# Patient Record
Sex: FEMALE | Race: WHITE | NOT HISPANIC OR LATINO
[De-identification: names, ages, dates, MRNs, and addresses within clinical notes are randomized per-mention and may not be internally consistent; named-entity substitution may affect disease eponyms.]

---

## 2017-10-26 PROBLEM — Z00.00 ENCOUNTER FOR PREVENTIVE HEALTH EXAMINATION: Status: ACTIVE | Noted: 2017-10-26

## 2017-10-31 ENCOUNTER — APPOINTMENT (OUTPATIENT)
Dept: ORTHOPEDIC SURGERY | Facility: CLINIC | Age: 57
End: 2017-10-31

## 2019-12-10 ENCOUNTER — EMERGENCY (EMERGENCY)
Facility: HOSPITAL | Age: 59
LOS: 1 days | Discharge: HOME | End: 2019-12-10
Attending: EMERGENCY MEDICINE | Admitting: EMERGENCY MEDICINE
Payer: MEDICARE

## 2019-12-10 VITALS
TEMPERATURE: 99 F | HEART RATE: 78 BPM | RESPIRATION RATE: 18 BRPM | SYSTOLIC BLOOD PRESSURE: 111 MMHG | DIASTOLIC BLOOD PRESSURE: 71 MMHG | WEIGHT: 160.06 LBS | OXYGEN SATURATION: 97 %

## 2019-12-10 PROCEDURE — 70450 CT HEAD/BRAIN W/O DYE: CPT | Mod: 26

## 2019-12-10 PROCEDURE — 99285 EMERGENCY DEPT VISIT HI MDM: CPT

## 2019-12-10 PROCEDURE — 93010 ELECTROCARDIOGRAM REPORT: CPT

## 2019-12-10 NOTE — ED ADULT TRIAGE NOTE - CHIEF COMPLAINT QUOTE
pt. with hx of Lt side hemiplegia, hx of CVA presents with c/o Rt side neck pain, headache that started around 1pm today and dizziness that started about 2 hours ago.

## 2019-12-11 VITALS
DIASTOLIC BLOOD PRESSURE: 57 MMHG | OXYGEN SATURATION: 95 % | RESPIRATION RATE: 16 BRPM | HEART RATE: 89 BPM | SYSTOLIC BLOOD PRESSURE: 97 MMHG | TEMPERATURE: 98 F

## 2019-12-11 LAB
ALBUMIN SERPL ELPH-MCNC: 3.9 G/DL — SIGNIFICANT CHANGE UP (ref 3.3–5)
ALP SERPL-CCNC: 159 U/L — HIGH (ref 40–120)
ALT FLD-CCNC: 13 U/L — SIGNIFICANT CHANGE UP (ref 10–45)
ANION GAP SERPL CALC-SCNC: 13 MMOL/L — SIGNIFICANT CHANGE UP (ref 5–17)
APTT BLD: 32.8 SEC — SIGNIFICANT CHANGE UP (ref 27.5–36.3)
AST SERPL-CCNC: 13 U/L — SIGNIFICANT CHANGE UP (ref 10–40)
BASOPHILS # BLD AUTO: 0.03 K/UL — SIGNIFICANT CHANGE UP (ref 0–0.2)
BASOPHILS NFR BLD AUTO: 0.3 % — SIGNIFICANT CHANGE UP (ref 0–2)
BILIRUB SERPL-MCNC: 0.4 MG/DL — SIGNIFICANT CHANGE UP (ref 0.2–1.2)
BLD GP AB SCN SERPL QL: NEGATIVE — SIGNIFICANT CHANGE UP
BUN SERPL-MCNC: 12 MG/DL — SIGNIFICANT CHANGE UP (ref 7–23)
CALCIUM SERPL-MCNC: 9.6 MG/DL — SIGNIFICANT CHANGE UP (ref 8.4–10.5)
CHLORIDE SERPL-SCNC: 103 MMOL/L — SIGNIFICANT CHANGE UP (ref 96–108)
CHOLEST SERPL-MCNC: 204 MG/DL — HIGH (ref 10–199)
CO2 SERPL-SCNC: 25 MMOL/L — SIGNIFICANT CHANGE UP (ref 22–31)
CREAT SERPL-MCNC: 0.58 MG/DL — SIGNIFICANT CHANGE UP (ref 0.5–1.3)
EOSINOPHIL # BLD AUTO: 0.09 K/UL — SIGNIFICANT CHANGE UP (ref 0–0.5)
EOSINOPHIL NFR BLD AUTO: 0.8 % — SIGNIFICANT CHANGE UP (ref 0–6)
GLUCOSE SERPL-MCNC: 128 MG/DL — HIGH (ref 70–99)
HBA1C BLD-MCNC: 6.1 % — HIGH (ref 4–5.6)
HCT VFR BLD CALC: 39.1 % — SIGNIFICANT CHANGE UP (ref 34.5–45)
HDLC SERPL-MCNC: 40 MG/DL — LOW
HGB BLD-MCNC: 11.7 G/DL — SIGNIFICANT CHANGE UP (ref 11.5–15.5)
IMM GRANULOCYTES NFR BLD AUTO: 0.4 % — SIGNIFICANT CHANGE UP (ref 0–1.5)
INR BLD: 1.01 — SIGNIFICANT CHANGE UP (ref 0.88–1.16)
LIPID PNL WITH DIRECT LDL SERPL: 130 MG/DL — HIGH
LYMPHOCYTES # BLD AUTO: 1.67 K/UL — SIGNIFICANT CHANGE UP (ref 1–3.3)
LYMPHOCYTES # BLD AUTO: 14.7 % — SIGNIFICANT CHANGE UP (ref 13–44)
MCHC RBC-ENTMCNC: 24.9 PG — LOW (ref 27–34)
MCHC RBC-ENTMCNC: 29.9 GM/DL — LOW (ref 32–36)
MCV RBC AUTO: 83.2 FL — SIGNIFICANT CHANGE UP (ref 80–100)
MONOCYTES # BLD AUTO: 0.73 K/UL — SIGNIFICANT CHANGE UP (ref 0–0.9)
MONOCYTES NFR BLD AUTO: 6.4 % — SIGNIFICANT CHANGE UP (ref 2–14)
NEUTROPHILS # BLD AUTO: 8.82 K/UL — HIGH (ref 1.8–7.4)
NEUTROPHILS NFR BLD AUTO: 77.4 % — HIGH (ref 43–77)
NRBC # BLD: 0 /100 WBCS — SIGNIFICANT CHANGE UP (ref 0–0)
PLATELET # BLD AUTO: 194 K/UL — SIGNIFICANT CHANGE UP (ref 150–400)
POTASSIUM SERPL-MCNC: 3.7 MMOL/L — SIGNIFICANT CHANGE UP (ref 3.5–5.3)
POTASSIUM SERPL-SCNC: 3.7 MMOL/L — SIGNIFICANT CHANGE UP (ref 3.5–5.3)
PROT SERPL-MCNC: 6.7 G/DL — SIGNIFICANT CHANGE UP (ref 6–8.3)
PROTHROM AB SERPL-ACNC: 11.4 SEC — SIGNIFICANT CHANGE UP (ref 10–12.9)
RBC # BLD: 4.7 M/UL — SIGNIFICANT CHANGE UP (ref 3.8–5.2)
RBC # FLD: 14.3 % — SIGNIFICANT CHANGE UP (ref 10.3–14.5)
RH IG SCN BLD-IMP: POSITIVE — SIGNIFICANT CHANGE UP
SODIUM SERPL-SCNC: 141 MMOL/L — SIGNIFICANT CHANGE UP (ref 135–145)
TOTAL CHOLESTEROL/HDL RATIO MEASUREMENT: 5.1 RATIO — SIGNIFICANT CHANGE UP (ref 3.3–7.1)
TRIGL SERPL-MCNC: 170 MG/DL — HIGH (ref 10–149)
TROPONIN T SERPL-MCNC: <0.01 NG/ML — SIGNIFICANT CHANGE UP (ref 0–0.01)
WBC # BLD: 11.39 K/UL — HIGH (ref 3.8–10.5)
WBC # FLD AUTO: 11.39 K/UL — HIGH (ref 3.8–10.5)

## 2019-12-11 PROCEDURE — 93010 ELECTROCARDIOGRAM REPORT: CPT

## 2019-12-11 PROCEDURE — 80061 LIPID PANEL: CPT

## 2019-12-11 PROCEDURE — 99285 EMERGENCY DEPT VISIT HI MDM: CPT | Mod: 25

## 2019-12-11 PROCEDURE — 36415 COLL VENOUS BLD VENIPUNCTURE: CPT

## 2019-12-11 PROCEDURE — 84484 ASSAY OF TROPONIN QUANT: CPT

## 2019-12-11 PROCEDURE — 80053 COMPREHEN METABOLIC PANEL: CPT

## 2019-12-11 PROCEDURE — 85025 COMPLETE CBC W/AUTO DIFF WBC: CPT

## 2019-12-11 PROCEDURE — 83036 HEMOGLOBIN GLYCOSYLATED A1C: CPT

## 2019-12-11 PROCEDURE — 70450 CT HEAD/BRAIN W/O DYE: CPT

## 2019-12-11 PROCEDURE — 93005 ELECTROCARDIOGRAM TRACING: CPT

## 2019-12-11 PROCEDURE — 0042T: CPT

## 2019-12-11 PROCEDURE — 70498 CT ANGIOGRAPHY NECK: CPT

## 2019-12-11 PROCEDURE — 86850 RBC ANTIBODY SCREEN: CPT

## 2019-12-11 PROCEDURE — 71045 X-RAY EXAM CHEST 1 VIEW: CPT | Mod: 26

## 2019-12-11 PROCEDURE — 86900 BLOOD TYPING SEROLOGIC ABO: CPT

## 2019-12-11 PROCEDURE — 85730 THROMBOPLASTIN TIME PARTIAL: CPT

## 2019-12-11 PROCEDURE — 70496 CT ANGIOGRAPHY HEAD: CPT

## 2019-12-11 PROCEDURE — 85610 PROTHROMBIN TIME: CPT

## 2019-12-11 PROCEDURE — 70496 CT ANGIOGRAPHY HEAD: CPT | Mod: 26

## 2019-12-11 PROCEDURE — 86901 BLOOD TYPING SEROLOGIC RH(D): CPT

## 2019-12-11 PROCEDURE — 71045 X-RAY EXAM CHEST 1 VIEW: CPT

## 2019-12-11 PROCEDURE — 82962 GLUCOSE BLOOD TEST: CPT

## 2019-12-11 PROCEDURE — 70498 CT ANGIOGRAPHY NECK: CPT | Mod: 26

## 2019-12-11 RX ORDER — DIAZEPAM 5 MG
10 TABLET ORAL ONCE
Refills: 0 | Status: DISCONTINUED | OUTPATIENT
Start: 2019-12-11 | End: 2019-12-11

## 2019-12-11 RX ORDER — OXYCODONE AND ACETAMINOPHEN 5; 325 MG/1; MG/1
1 TABLET ORAL ONCE
Refills: 0 | Status: DISCONTINUED | OUTPATIENT
Start: 2019-12-11 | End: 2019-12-11

## 2019-12-11 RX ADMIN — Medication 10 MILLIGRAM(S): at 00:51

## 2019-12-11 RX ADMIN — OXYCODONE AND ACETAMINOPHEN 1 TABLET(S): 5; 325 TABLET ORAL at 03:27

## 2019-12-11 NOTE — ED ADULT NURSE NOTE - NSIMPLEMENTINTERV_GEN_ALL_ED
Implemented All Fall with Harm Risk Interventions:  [place] to call system. Call bell, personal items and telephone within reach. Instruct patient to call for assistance. Room bathroom lighting operational. Non-slip footwear when patient is off stretcher. Physically safe environment: no spills, clutter or unnecessary equipment. Stretcher in lowest position, wheels locked, appropriate side rails in place. Provide visual cue, wrist band, yellow gown, etc. Monitor gait and stability. Monitor for mental status changes and reorient to person, place, and time. Review medications for side effects contributing to fall risk. Reinforce activity limits and safety measures with patient and family. Provide visual clues: red socks.

## 2019-12-11 NOTE — ED PROVIDER NOTE - PATIENT PORTAL LINK FT
You can access the FollowMyHealth Patient Portal offered by [facility] by registering at the following website: [email or web address]. By joining [facility]’s FollowMyHealth portal, you will also be able to view your health information using other applications (apps) compatible with our system.

## 2019-12-11 NOTE — ED PROVIDER NOTE - OBJECTIVE STATEMENT
58 y/o F with PMHx of subdural hematoma as infant and CVA 6 years ago with residual left sided hemiplegia presents to ED wheelchair bound from nursing home complaining of right sided neck pain and stiffness for 10 hrs, followed by dizziness for past 2 hrs. Pt denies trauma, fall chest pain, SOB, change in vision or speech. Pt states she is on a lot of prescription medication but did not bring in any paperwork from nursing home, therefore unclear what complete PMHx is. 58 y/o F with PMHx of chronic back pain,  subdural hematoma as infant and CVA 6 years ago with residual left sided hemiplegia presents to ED wheelchair bound from nursing home complaining of right sided neck pain and stiffness for 10 hrs, followed by dizziness for past 2 hrs. Pt denies trauma, fall chest pain, SOB, change in vision or speech. Pt states she is on a lot of prescription medication but did not bring in any paperwork from nursing home, therefore unclear what complete PMHx is.

## 2019-12-11 NOTE — ED PROVIDER NOTE - CLINICAL SUMMARY MEDICAL DECISION MAKING FREE TEXT BOX
58 y/o F with PMHx of subdural hematoma as infant, and CVA 6 years ago presents to ED complaining of     right sided neck pain x 10 hrs and dizziness x 2 hrs. Given     acuity of symptoms and CVA hx stroke code called. Pt sent immediately to CT scan. Disposition pending workup. 58 y/o F with PMHx of subdural hematoma as infant, and CVA 6 years ago presents to ED complaining of right sided neck pain x 10 hrs and dizziness x 2 hrs. Given  acuity of symptoms and hx of CVA hx stroke code called. Pt sent immediately to CT scan. Disposition pending workup.

## 2019-12-11 NOTE — ED PROVIDER NOTE - NSFOLLOWUPINSTRUCTIONS_ED_ALL_ED_FT
Please follow up with your primary care physician. If you have any problem getting an appointment please call the ED Referral Coordinator at [phone].  Return to the Emergency Department if you have any new or worsening symptoms, or for any other concerns. Please read below for further information.    You were seen in the ER for neck pain and dizziness. Your workup did not reveal any evidence of acute stroke. Your labs work is attached.   Please follow up with your doctor for further mgmt of you musculoskeletal pain.    Musculoskeletal Pain  Musculoskeletal pain refers to aches and pains in your bones, joints, muscles, and the tissues that surround them. This pain can occur in any part of the body. It can last for a short time (acute) or a long time (chronic).    A physical exam, lab tests, and imaging studies may be done to find the cause of your musculoskeletal pain.    Follow these instructions at home:  Lifestyle     Try to control or lower your stress levels. Stress increases muscle tension and can worsen musculoskeletal pain. It is important to recognize when you are anxious or stressed and learn ways to manage it. This may include:    Meditation or yoga.  Cognitive or behavioral therapy.  Acupuncture or massage therapy.    You may continue all activities unless the activities cause more pain. When the pain gets better, slowly resume your normal activities. Gradually increase the intensity and duration of your activities or exercise.  Managing pain, stiffness, and swelling     Take over-the-counter and prescription medicines only as told by your health care provider.  When your pain is severe, bed rest may be helpful. Lie or sit in any position that is comfortable, but get out of bed and walk around at least every couple of hours.  If directed, apply heat to the affected area as often as told by your health care provider. Use the heat source that your health care provider recommends, such as a moist heat pack or a heating pad.    Place a towel between your skin and the heat source.  Leave the heat on for 20–30 minutes.  Remove the heat if your skin turns bright red. This is especially important if you are unable to feel pain, heat, or cold. You may have a greater risk of getting burned.    If directed, put ice on the painful area.    Put ice in a plastic bag.  Place a towel between your skin and the bag.  Leave the ice on for 20 minutes, 2–3 times a day.    General instructions:  Your health care provider may recommend that you see a physical therapist. This person can help you come up with a safe exercise program. Do any exercises as told by your physical therapist.  Keep all follow-up visits, including any physical therapy visits, as told by your health care providers. This is important.  Contact a health care provider if:  Your pain gets worse.  Medicines do not help ease your pain.  You cannot use the part of your body that hurts, such as your arm, leg, or neck.  You have trouble sleeping.  You have trouble doing your normal activities.  Get help right away if:  You have a new injury and your pain is worse or different.  You feel numb or you have tingling in the painful area.  Summary  Musculoskeletal pain refers to aches and pains in your bones, joints, muscles, and the tissues that surround them.  This pain can occur in any part of the body.  Your health care provider may recommend that you see a physical therapist. This person can help you come up with a safe exercise program. Do any exercises as told by your physical therapist.  Lower your stress level. Stress can worsen musculoskeletal pain. Ways to lower stress may include meditation, yoga, cognitive or behavioral therapy, acupuncture, and massage therapy.  This information is not intended to replace advice given to you by your health care provider. Make sure you discuss any questions you have with your health care provider.

## 2019-12-11 NOTE — CONSULT NOTE ADULT - SUBJECTIVE AND OBJECTIVE BOX
**STROKE CODE CONSULT NOTE**    Last known well time/Time of onset of symptoms:    HPI: 59y Female with PMHx of     T(C): 37 (12-10-19 @ 23:16), Max: 37 (12-10-19 @ 23:16)  HR: 75 (12-11-19 @ 01:09) (75 - 78)  BP: 111/53 (12-11-19 @ 01:09) (111/53 - 139/74)  RR: 16 (12-11-19 @ 01:09) (16 - 18)  SpO2: 97% (12-11-19 @ 01:09) (97% - 99%)    PAST MEDICAL & SURGICAL HISTORY:      FAMILY HISTORY:      SOCIAL HISTORY:  Denies smoking, drinking, or drug use    ROS: ***  Constitutional: No fever, weight loss or fatigue  Eyes: No eye pain, visual disturbances, or discharge  ENMT:  No difficulty hearing, tinnitus, vertigo; No sinus or throat pain  Neck: No pain or stiffness  Respiratory: No cough, wheezing, chills or hemoptysis  Cardiovascular: No chest pain, palpitations, shortness of breath, dizziness or leg swelling  Gastrointestinal: No abdominal pain. No nausea, vomiting or hematemesis; No diarrhea or constipation. Nohematochezia.  Genitourinary: No dysuria, frequency, hematuria or incontinence  Neurological: As per HPI  Skin: No itching, burning, rashes or lesions   Endocrine: No heat or cold intolerance; No hair loss  Musculoskeletal: No joint pain or swelling; No muscle, back or extremity pain  Psychiatric: No depression, anxiety, mood swings or difficulty sleeping  Heme/Lymph: No easy bruising or bleeding gums    MEDICATIONS  (STANDING):    MEDICATIONS  (PRN):    Allergies    No Known Allergies    Intolerances      Vital Signs Last 24 Hrs  T(C): 37 (10 Dec 2019 23:16), Max: 37 (10 Dec 2019 23:16)  T(F): 98.6 (10 Dec 2019 23:16), Max: 98.6 (10 Dec 2019 23:16)  HR: 75 (11 Dec 2019 01:09) (75 - 78)  BP: 111/53 (11 Dec 2019 01:09) (111/53 - 139/74)  BP(mean): --  RR: 16 (11 Dec 2019 01:09) (16 - 18)  SpO2: 97% (11 Dec 2019 01:09) (97% - 99%)    Physical exam:  General: No acute distress, awake and alert  Cardiovascular: Regular rate and rhythm, no murmurs, rubs, or gallops. No bruits  Pulmonary: Anterior breath sounds clear bilaterally, no crackles or wheezing. No use of accessory muscles  Extremities: Radial and DP pulses +2, no edema    Neurologic:  -Mental status: Awake, alert, oriented to person, place, and time. Speech is fluent with intact naming, repetition, and comprehension, no dysarthria. Recent and remote memory intact. Follows commands. Attention/concentration intact. Fund of knowledge appropriate.  -Cranial nerves:   II: Visual fields are full to confrontation.  III, IV, VI: Extraocular movements are intact without nystagmus. Pupils equally round and reactive to light  V:  Facial sensation V1-V3 equal and intact   VII: Face is symmetric with normal eye closure and smile  VIII: Hearing is bilaterally intact to finger rub  IX, X: Uvula is midline and soft palate rises symmetrically  XI: Head turning and shoulder shrug are intact.  XII: Tongue protrudes midline  Motor: Normal bulk and tone. No pronator drift. Strength bilateral upper extremity 5/5, bilateral lower extremities 5/5.  Rapid alternating movements intact and symmetric  Sensation: Intact to light touch bilaterally. No neglect or extinction on double simultaneous testing.  Coordination: No dysmetria on finger-to-nose and heel-to-shin bilaterally  Reflexes: Downgoing toes bilaterally   Gait: Narrow gait and steady    NIHSS: ****    Fingerstick Blood Glucose: CAPILLARY BLOOD GLUCOSE      POCT Blood Glucose.: 110 mg/dL (10 Dec 2019 23:32)    LABS:                        11.7   11.39 )-----------( 194      ( 11 Dec 2019 00:00 )             39.1     12-11    141  |  103  |  12  ----------------------------<  128<H>  3.7   |  25  |  0.58    Ca    9.6      11 Dec 2019 00:00    TPro  6.7  /  Alb  3.9  /  TBili  0.4  /  DBili  x   /  AST  13  /  ALT  13  /  AlkPhos  159<H>  12-11    PT/INR - ( 11 Dec 2019 00:00 )   PT: 11.4 sec;   INR: 1.01          PTT - ( 11 Dec 2019 00:00 )  PTT:32.8 sec  CARDIAC MARKERS ( 11 Dec 2019 00:00 )  x     / <0.01 ng/mL / x     / x     / x              RADIOLOGY & ADDITIONAL STUDIES:    HCT:    CTA:    CTP:      -----------------------------------------------------------------------------------------------------------------  IV-tPA (Y/N):    ***                              Bolus time:  Reason IV-tPA not given: ***    ASSESSMENT/PLAN:    59y Female w/ PMH ***. HCT showed ***. CTA showed ***. Given *** tPA was ***. **STROKE CODE CONSULT NOTE**    Last known well time/Time of onset of symptoms: 12/10 1PM     HPI: 59y Female poor historianwith PMHx of subdural hematoma (age 4 years old with residual L sided weakness), CVA (6 years ago with residual gait instability), chronic back pain presents [facility] ED with R sided neck pain and dizziness. Pt reports that symptoms began at 1PM today. She denies this ever happening to her before. She states that symptoms came on suddenly. Reports that she feels like the room in spinning. Denies HA, weakness, changes in vision, nausea, abdominal pain. Denies being on any blood thinning medications. Pt is unsure of her other past medical history and she is unsure of which medications she takes but states she takes many. Reports that she is wheelchair bound since her stroke 6 years ago and states that she cannot care for herself.    T(C): 37 (12-10-19 @ 23:16), Max: 37 (12-10-19 @ 23:16)  HR: 75 (12-11-19 @ 01:09) (75 - 78)  BP: 111/53 (12-11-19 @ 01:09) (111/53 - 139/74)  RR: 16 (12-11-19 @ 01:09) (16 - 18)  SpO2: 97% (12-11-19 @ 01:09) (97% - 99%)    PAST MEDICAL & SURGICAL HISTORY:    SOCIAL HISTORY:  Denies smoking, drinking, or drug use    ROS:  Constitutional: No fever, weight loss or fatigue  Eyes: No eye pain, visual disturbances, or discharge  ENMT:  No difficulty hearing, tinnitus, vertigo; No sinus or throat pain  Neck: No pain or stiffness  Respiratory: No cough, wheezing, chills or hemoptysis  Cardiovascular: No chest pain, palpitations, shortness of breath, dizziness or leg swelling  Gastrointestinal: No abdominal pain. No nausea, vomiting or hematemesis; No diarrhea or constipation. Nohematochezia.  Genitourinary: No dysuria, frequency, hematuria or incontinence  Neurological: As per HPI  Skin: No itching, burning, rashes or lesions   Endocrine: No heat or cold intolerance; No hair loss  Musculoskeletal: No joint pain or swelling; No muscle, back or extremity pain  Psychiatric: No depression, anxiety, mood swings or difficulty sleeping  Heme/Lymph: No easy bruising or bleeding gums    MEDICATIONS  (STANDING):  Reports that she takes daily Gabapentin but is unsure of her dose    MEDICATIONS  (PRN):    Allergies    No Known Allergies    Intolerances      Vital Signs Last 24 Hrs  T(C): 37 (10 Dec 2019 23:16), Max: 37 (10 Dec 2019 23:16)  T(F): 98.6 (10 Dec 2019 23:16), Max: 98.6 (10 Dec 2019 23:16)  HR: 75 (11 Dec 2019 01:09) (75 - 78)  BP: 111/53 (11 Dec 2019 01:09) (111/53 - 139/74)  BP(mean): --  RR: 16 (11 Dec 2019 01:09) (16 - 18)  SpO2: 97% (11 Dec 2019 01:09) (97% - 99%)    Physical exam:  General: Anxious appearing, awake and alert  Cardiovascular: Regular rate and rhythm, no murmurs, rubs, or gallops. No bruits  Pulmonary: Anterior breath sounds clear bilaterally, no crackles or wheezing. No use of accessory muscles  Extremities: Radial and DP pulses +2, no edema    Neurologic:  -Mental status: Awake, alert, oriented to person, place, and time. Speech is fluent, no dysarthria. Recent and remote memory intact. Follows commands. Attention/concentration intact. Fund of knowledge appropriate.  -Cranial nerves:   II: Visual fields are full to confrontation.  III, IV, VI: Extraocular movements are intact without nystagmus. Pupils equally round and reactive to light, R eye is squeezed shut but patient can open it on command  V:  Facial sensation V1-V3 equal and intact   VII: Face is symmetric with normal eye closure and smile, pt keeps R eye closed but can open it on command  VIII: Hearing is bilaterally intact to finger rub  IX, X: Uvula is midline and soft palate rises symmetrically  XI: Head turning and shoulder shrug are intact.  XII: Tongue protrudes midline  Motor: Normal bulk and tone on R sided, reduced bulk and tone in LUE and LLE. No pronator drift on R. Strength in R upper extremity and RLE 5/5, LUE 4/5 strength, LLE 3/5 strength.  Sensation: Intact to light touch bilaterally. No neglect or extinction on double simultaneous testing.  Coordination: No dysmetria on finger-to-nose with R hand    NIHSS: 5    Fingerstick Blood Glucose: CAPILLARY BLOOD GLUCOSE      POCT Blood Glucose.: 110 mg/dL (10 Dec 2019 23:32)    LABS:                        11.7   11.39 )-----------( 194      ( 11 Dec 2019 00:00 )             39.1     12-11    141  |  103  |  12  ----------------------------<  128<H>  3.7   |  25  |  0.58    Ca    9.6      11 Dec 2019 00:00    TPro  6.7  /  Alb  3.9  /  TBili  0.4  /  DBili  x   /  AST  13  /  ALT  13  /  AlkPhos  159<H>  12-11    PT/INR - ( 11 Dec 2019 00:00 )   PT: 11.4 sec;   INR: 1.01          PTT - ( 11 Dec 2019 00:00 )  PTT:32.8 sec  CARDIAC MARKERS ( 11 Dec 2019 00:00 )  x     / <0.01 ng/mL / x     / x     / x              RADIOLOGY & ADDITIONAL STUDIES:    HCT: < from: CT Brain Stroke Protocol (12.10.19 @ 23:57) >  No acute intracranial hemorrhage or territorial infarction.    CTA: < from: CT Angio Head w/ IV Cont (12.11.19 @ 00:06) >  No steno-occlusive disease in the carotid or vertebral   arteries in theneck.    < end of copied text >  < from: CT Angio Head w/ IV Cont (12.11.19 @ 00:06) >  Impression: No large vessel occlusion or high-grade stenosis.    CTP:  < from: CT Perfusion w/ Maps w/ IV Cont (12.11.19 @ 00:07) >  No significant perfusion abnormality.    -----------------------------------------------------------------------------------------------------------------  IV-tPA (Y/N):   No                            Bolus time:  Reason IV-tPA not given: Stroke not seen on CT and not suspected on exam findings    ASSESSMENT/PLAN:    59y Female w/ PMHx of subdural hematoma (age 4 years old with residual L sided weakness), CVA (6 years ago with residual gait instability), chronic back pain. HCT showed no acute intracranial hemorrhage or infarct. CTA showed no occlusive disease in the carotid or vertebral arteries and no large vessle occlusion. Given negative CTH and low suspicion of stroke based on exam findings, tPA was no given.  - consider fluids and meclizine for dizziness  - pt does not appear to have had an acute stroke based on CT imaging and exam findings  - Dispo per ED team, pt does not need admission for stroke

## 2019-12-11 NOTE — ED PROVIDER NOTE - PROGRESS NOTE DETAILS
Pt feels much better, dizziness resolved. workup not c/w stroke, neuro team signed off. pt requests to be transferred back to her facility. She was advised to f/u with her doctors. Return precautions discussed.

## 2019-12-11 NOTE — ED ADULT NURSE NOTE - OBJECTIVE STATEMENT
pt. biba from NH with c/o Rt side neck pain, headache that started around 1pm and dizziness with blurry vision that started at around 9pm. pt. reports hx of brain bleed when she was a child and CVA 6 years ago that made her paralyzed and bedbound. stroke code was activated, ct done, blood was sent to lab, cardiac monitor in progress.

## 2019-12-11 NOTE — ED PROVIDER NOTE - PHYSICAL EXAMINATION
GEN: Well appearing, well nourished, awake, alert, oriented to person, place, time/situation and in no apparent distress.  ENT: Airway patent, Nasal mucosa clear. Mouth with normal mucosa.  EYES: Clear bilaterally.  RESPIRATORY: Breathing comfortably with normal RR.  CARDIAC: Regular rate and rhythm  ABDOMEN: Soft, nontender, +bowel sounds, no rebound, rigidity, or guarding.  MSK: Range of motion is not limited, no deformities noted.  NEURO: Alert and oriented, no focal deficits.  SKIN: Skin normal color for race, warm, dry and intact. No evidence of rash.  PSYCH: Alert and oriented to person, place, time/situation. normal mood and affect. no apparent risk to self or others. GEN: Well develop, well nourished, awake, alert, oriented to person, place, time/situation and in no apparent distress.  ENT: Airway patent, Nasal mucosa clear. Mouth with normal mucosa.  EYES: Clear bilaterally.  RESPIRATORY: Breathing comfortably with normal RR.  CARDIAC: Regular rate and rhythm  ABDOMEN: Soft, nontender, +bowel sounds, no rebound, rigidity, or guarding.  MSK: Left hemiplegia, +right paracervical muscel TTP, otherwise Range of motion is not limited, no deformities noted.  NEURO: Alert and oriented x 3. Left sided hemiplegia. Pt is bedbound.  SKIN: Skin normal color for race, warm, dry and intact. No evidence of rash.  PSYCH: Alert and oriented to person, place, time/situation. normal mood and affect. no apparent risk to self or others.

## 2019-12-17 DIAGNOSIS — M54.2 CERVICALGIA: ICD-10-CM

## 2019-12-17 DIAGNOSIS — R29.705 NIHSS SCORE 5: ICD-10-CM

## 2019-12-17 DIAGNOSIS — R42 DIZZINESS AND GIDDINESS: ICD-10-CM

## 2019-12-17 DIAGNOSIS — G81.92 HEMIPLEGIA, UNSPECIFIED AFFECTING LEFT DOMINANT SIDE: ICD-10-CM

## 2020-10-18 ENCOUNTER — EMERGENCY (EMERGENCY)
Facility: HOSPITAL | Age: 60
LOS: 1 days | Discharge: HOME | End: 2020-10-18
Attending: EMERGENCY MEDICINE | Admitting: EMERGENCY MEDICINE
Payer: MEDICARE

## 2020-10-18 VITALS
RESPIRATION RATE: 18 BRPM | TEMPERATURE: 99 F | SYSTOLIC BLOOD PRESSURE: 111 MMHG | WEIGHT: 160.06 LBS | OXYGEN SATURATION: 95 % | DIASTOLIC BLOOD PRESSURE: 72 MMHG | HEART RATE: 81 BPM | HEIGHT: 62 IN

## 2020-10-18 VITALS
OXYGEN SATURATION: 94 % | DIASTOLIC BLOOD PRESSURE: 79 MMHG | TEMPERATURE: 99 F | SYSTOLIC BLOOD PRESSURE: 117 MMHG | RESPIRATION RATE: 18 BRPM | HEART RATE: 88 BPM

## 2020-10-18 DIAGNOSIS — M25.561 PAIN IN RIGHT KNEE: ICD-10-CM

## 2020-10-18 DIAGNOSIS — M54.5 LOW BACK PAIN: ICD-10-CM

## 2020-10-18 PROBLEM — S06.5X9A TRAUMATIC SUBDURAL HEMORRHAGE WITH LOSS OF CONSCIOUSNESS OF UNSPECIFIED DURATION, INITIAL ENCOUNTER: Chronic | Status: ACTIVE | Noted: 2019-12-12

## 2020-10-18 PROBLEM — I63.9 CEREBRAL INFARCTION, UNSPECIFIED: Chronic | Status: ACTIVE | Noted: 2019-12-12

## 2020-10-18 PROCEDURE — 73562 X-RAY EXAM OF KNEE 3: CPT

## 2020-10-18 PROCEDURE — 96372 THER/PROPH/DIAG INJ SC/IM: CPT

## 2020-10-18 PROCEDURE — 99284 EMERGENCY DEPT VISIT MOD MDM: CPT | Mod: 25

## 2020-10-18 PROCEDURE — 73562 X-RAY EXAM OF KNEE 3: CPT | Mod: 26

## 2020-10-18 PROCEDURE — 73562 X-RAY EXAM OF KNEE 3: CPT | Mod: 26,RT

## 2020-10-18 RX ORDER — KETOROLAC TROMETHAMINE 30 MG/ML
30 SYRINGE (ML) INJECTION ONCE
Refills: 0 | Status: DISCONTINUED | OUTPATIENT
Start: 2020-10-18 | End: 2020-10-18

## 2020-10-18 RX ORDER — MORPHINE SULFATE 50 MG/1
4 CAPSULE, EXTENDED RELEASE ORAL ONCE
Refills: 0 | Status: DISCONTINUED | OUTPATIENT
Start: 2020-10-18 | End: 2020-10-18

## 2020-10-18 RX ADMIN — MORPHINE SULFATE 4 MILLIGRAM(S): 50 CAPSULE, EXTENDED RELEASE ORAL at 18:12

## 2020-10-18 NOTE — ED PROVIDER NOTE - CLINICAL SUMMARY MEDICAL DECISION MAKING FREE TEXT BOX
R knee and R lower back pain that occurred today, atraumatic. NVI. Did not arrive here with nursing home papers. Do not suspect DVT on RLE given no edema. Vascular insufficiency unlikely given good pulses. Will obtain xray of R knee and give meds for pain. Impression: R knee and R lower back pain starting today, atraumatic. NVI. Do not suspect DVT. Vascular insufficiency unlikely given good pulses. Xrays of r knee + for calcifications to patellar tendon. Knee pain likely 2/2 calcific tendonitis. Back pain likely strain. Do not suspect cauda equina/ infectious process. Pt refused toradol and was given morphine im. ED evaluation and management discussed with the patient in detail.  Close PMD follow up encouraged.  Strict ED return instructions discussed in detail and patient given the opportunity to ask any questions about their discharge diagnosis and instructions. Patient verbalized understanding.

## 2020-10-18 NOTE — ED ADULT NURSE NOTE - OBJECTIVE STATEMENT
pt is a 59 y/o F with PMHx of chronic back pain, CVA 6 years ago with residual left sided hemiplegia L5S1 prior pump removal 6 years was BIBA from [facility] for R leg and R lower back pain that occurred today. No trauma, fall, or twisting to her back. Pt was able to transfer from bed to wheelchair with no difficulty. No radiating pain, numbness/ tingling/ weakness to extremities. No bowel or bladder disfunction, flank pain, dysuria, hematuria, abd pain, fever, chills.

## 2020-10-18 NOTE — ED ADULT TRIAGE NOTE - CHIEF COMPLAINT QUOTE
patient BIBA from nursing home complains of right atraumatic knee pain since today. denies chest pain, fever, sob

## 2020-10-18 NOTE — ED PROVIDER NOTE - CHPI ED SYMPTOMS NEG
No radiating pain, numbness/ tingling/ weakness to extremities. No bowel or bladder disfunction, flank pain, dysuria, hematuria, abd pain, fever, chills.

## 2020-10-18 NOTE — ED PROVIDER NOTE - LAB INTERPRETATION
ER Physician: June Ree  knee xray INTERPRETATION: + calcified tendons, no acute fracture; no soft tissue swelling noted; normal bony alignment.

## 2020-10-18 NOTE — ED PROVIDER NOTE - OBJECTIVE STATEMENT
61 y/o F with PMHx of chronic back pain, subdural hematoma as infant and CVA 6 years ago with residual left sided hemiplegia L5S1 prior pump removal 6 years was BIBA from [facility] for R leg and R lower back pain that occurred today. No trauma, fall, or twisting to her back. Pt was able to transfer from bed to wheelchair with no difficulty. No radiating pain, numbness/ tingling/ weakness to extremities. No bowel or bladder disfunction, flank pain, dysuria, hematuria, abd pain, fever, chills. 59 y/o F with PMHx of chronic back pain, subdural hematoma as infant, CVA 6 years ago with residual left sided hemiplegia, L5/S1 laminectomy, BIBA from [facility] for R knee and R lower back pain that occurred today. No trauma, fall, or twisting injuries. Pt was able to transfer from bed to wheelchair with no difficulty prior to onset of sx's. No radiating pain, numbness/ tingling/ weakness to rle. No bowel or bladder disfunction, flank pain, dysuria, hematuria, abd pain, fever, chills.

## 2020-10-18 NOTE — ED PROVIDER NOTE - PHYSICAL EXAMINATION
VITAL SIGNS: I have reviewed nursing notes and confirm.  CONSTITUTIONAL: Well-developed; well-nourished; in no acute distress.   SKIN:  warm and dry, no acute rash.   HEAD:  normocephalic, atraumatic.  EYES: EOM intact; conjunctiva and sclera clear.  ENT: No nasal discharge; airway clear.   NECK: Supple; non tender.  CARD: S1, S2 normal; no murmurs, gallops, or rubs. Regular rate and rhythm.   RESP:  Clear to auscultation b/l, no wheezes, rales or rhonchi.  ABD: Normal bowel sounds; soft; non-distended; non-tender; no guarding/ rebound.  EXT: Normal ROM. No clubbing, cyanosis or edema. 2+ pulses to b/l ue/le. tenderness to palpation to the R knee. No warmth in knee joint. No swelling, erythema, or effusion. Limited ROM. No tenderness to palpation of hip, femur, ankle, or foot. Distal pulses intact. Compartments soft.   Back: no midline spine tenderness. tenderness to R paralumbar msk. No flank tenderness. Negative straight leg raise.   NEURO: Alert, oriented, grossly unremarkable. weak to RLE due to stroke. Motor sensation intact.   PSYCH: Cooperative, mood and affect appropriate. VITAL SIGNS: I have reviewed nursing notes and confirm.  CONSTITUTIONAL: Well-developed; well-nourished; in no acute distress.   SKIN:  warm and dry, no acute rash.   HEAD:  normocephalic, atraumatic.  EYES: EOM intact; conjunctiva and sclera clear.  ENT: No nasal discharge; airway clear.   NECK: Supple; non tender.  CARD: S1, S2 normal; no murmurs, gallops, or rubs. Regular rate and rhythm.   RESP:  Clear to auscultation b/l, no wheezes, rales or rhonchi.  ABD: Normal bowel sounds; soft; non-distended; non-tender; no guarding/ rebound.  EXT: Normal ROM. No clubbing, cyanosis or edema. 2+ pulses to b/l ue/le. + tenderness to palpation to the R patella and lateral aspect of knee. No warmth to knee joint. No swelling, erythema, or effusion. Mildly limited ROM. No tenderness to palpation of hip, femur, ankle, or foot. Compartments soft.   Back: no midline spine tenderness. + tenderness to R paralumbar region. No flank tenderness. Negative straight leg raise.   NEURO: Alert, oriented, motor/ sensation grossly unremarkable. + chronic weakness to LLE due to prior stroke.   PSYCH: Cooperative, mood and affect appropriate.

## 2020-10-18 NOTE — ED PROVIDER NOTE - NSFOLLOWUPINSTRUCTIONS_ED_ALL_ED_FT
Follow up with your orthopedist for re-evaluation.   Take pain medications as needed.  Return to er for any new or worsening symptoms (fever, chills, worsening knee pain, redness, swelling..)    Back Pain    Back pain is very common in adults. The cause of back pain is rarely dangerous and the pain often gets better over time. The cause of your back pain may not be known and may include strain of muscles or ligaments, degeneration of the spinal disks, or arthritis. Occasionally the pain may radiate down your leg(s). Over-the-counter medicines to reduce pain and inflammation are often the most helpful. Stretching and remaining active frequently helps the healing process.     SEEK IMMEDIATE MEDICAL CARE IF YOU HAVE ANY OF THE FOLLOWING SYMPTOMS: bowel or bladder control problems, unusual weakness or numbness in your arms or legs, nausea or vomiting, abdominal pain, fever, dizziness/lightheadedness.                 Log Out.      [employer] CareNotes®     :  [facility]  	                       PATELLAR TENDINITIS - General Information           Patellar Tendinitis    WHAT YOU NEED TO KNOW:    What is patellar tendinitis? Patellar tendinitis is inflammation or irritation of the tendon that connects your kneecap to your shinbone. It may be a short-term condition or develop into a long-term weakness in your knee.    What increases my risk for patellar tendinitis?   •Overuse or sudden increase in activity      •Activities that involve jumping or quick changes of direction      •Lack of flexibility       •Muscle weakness or imbalance       •Sedentary lifestyle      •Certain antibiotics       What are the signs and symptoms of patellar tendinitis? Patellar tendonitis begins with pain or swelling at your knee. You may feel sharp or aching pain before or after activity. Your condition may progress until you feel pain all of the time.     How is patellar tendonitis diagnosed? Your doctor will examine your knee and the muscles around your knee. He will ask about your symptoms and when they began. He will also ask if you have recently taken antibiotics or have had a previous knee injury.  •An ultrasound uses sound waves to show pictures of your knee joint on a monitor. An ultrasound may be done to check for the cause of your symptoms.       •An x-ray or MRI takes pictures to show if you have damage in your patellar tendon. You may be given dye to help the pictures show up better. Tell your healthcare provider if you have ever had an allergic reaction to contrast dye. Do not enter the MRI room with anything metal. Metal can cause serious injury. Tell the healthcare provider if you have any metal in or on your body.       How is patellar tendinitis treated?   •NSAIDs, such as ibuprofen, help decrease swelling, pain, and fever. This medicine is available with or without a doctor's order. NSAIDs can cause stomach bleeding or kidney problems in certain people. If you take blood thinner medicine, always ask if NSAIDs are safe for you. Always read the medicine label and follow directions. Do not give these medicines to children under 6 months of age without direction from your child's healthcare provider.      •Acetaminophen decreases pain. This medicine is available without a doctor's order. Ask how much to take and how often to take it. Acetaminophen can cause liver damage if not taken correctly.       •Injections may reduce inflammation and help your tendon heal.       •Surgery may be needed remove damaged tissue to allow the tendon to heal.       How can I manage my symptoms?   •Rest your knee so it can heal. Do not begin an activity until directed by your healthcare provider.       •Ice your knee 15 to 20 minutes every hour or as directed. Use and ice pack, or put crushed ice in a bag. Cover it with a towel. Ice prevents tissue damage and decreases swelling and pain.   Ice and Elevation           •Support your knee as directed. Ask your healthcare provider for more information on types of braces that support your patellar tendon and allow it to heal.       •Go to physical therapy as directed. A physical therapist can teach you exercises to stretch and strengthen leg muscles that support your tendon.       When should I contact my healthcare provider?   •You have a fever.       •You have sudden swelling in your knee.       •Your pain continues or gets worse even after you take pain medicine.       •The skin over your knee becomes red and swollen.      •You have questions or concerns about your condition or care.      When should I seek immediate care or call 911?   •You hear a sudden snap or pop or see immediate bruising around your knee.       •You cannot bend your knee or bear weight on your leg.       CARE AGREEMENT:    You have the right to help plan your care. Learn about your health condition and how it may be treated. Discuss treatment options with your healthcare providers to decide what care you want to receive. You always have the right to refuse treatment.        © Copyright [employer] 2020           back to top                          © Copyright [employer] 2020

## 2020-10-18 NOTE — ED PROVIDER NOTE - PATIENT PORTAL LINK FT
You can access the FollowMyHealth Patient Portal offered by [facility] by registering at the following website: [email or web address]. By joining [employer]’s FollowMyHealth portal, you will also be able to view your health information using other applications (apps) compatible with our system.

## 2021-04-27 ENCOUNTER — NON-APPOINTMENT (OUTPATIENT)
Age: 61
End: 2021-04-27

## 2021-04-27 ENCOUNTER — APPOINTMENT (OUTPATIENT)
Dept: OPHTHALMOLOGY | Facility: CLINIC | Age: 61
End: 2021-04-27
Payer: MEDICARE

## 2021-04-27 PROCEDURE — 92002 INTRM OPH EXAM NEW PATIENT: CPT

## 2021-04-30 ENCOUNTER — NON-APPOINTMENT (OUTPATIENT)
Age: 61
End: 2021-04-30

## 2021-04-30 ENCOUNTER — APPOINTMENT (OUTPATIENT)
Dept: OPHTHALMOLOGY | Facility: CLINIC | Age: 61
End: 2021-04-30
Payer: MEDICARE

## 2021-04-30 PROCEDURE — 92012 INTRM OPH EXAM EST PATIENT: CPT

## 2021-05-01 ENCOUNTER — EMERGENCY (EMERGENCY)
Facility: HOSPITAL | Age: 61
LOS: 1 days | Discharge: HOME | End: 2021-05-01
Admitting: EMERGENCY MEDICINE
Payer: MEDICARE

## 2021-05-01 VITALS
HEART RATE: 71 BPM | SYSTOLIC BLOOD PRESSURE: 117 MMHG | RESPIRATION RATE: 17 BRPM | TEMPERATURE: 98 F | HEIGHT: 62 IN | OXYGEN SATURATION: 98 % | DIASTOLIC BLOOD PRESSURE: 68 MMHG

## 2021-05-01 DIAGNOSIS — I69.352 HEMIPLEGIA AND HEMIPARESIS FOLLOWING CEREBRAL INFARCTION AFFECTING LEFT DOMINANT SIDE: ICD-10-CM

## 2021-05-01 DIAGNOSIS — Z86.79 PERSONAL HISTORY OF OTHER DISEASES OF THE CIRCULATORY SYSTEM: ICD-10-CM

## 2021-05-01 DIAGNOSIS — H57.12 OCULAR PAIN, LEFT EYE: ICD-10-CM

## 2021-05-01 DIAGNOSIS — M54.9 DORSALGIA, UNSPECIFIED: ICD-10-CM

## 2021-05-01 DIAGNOSIS — G89.29 OTHER CHRONIC PAIN: ICD-10-CM

## 2021-05-01 PROCEDURE — 96372 THER/PROPH/DIAG INJ SC/IM: CPT

## 2021-05-01 PROCEDURE — 99284 EMERGENCY DEPT VISIT MOD MDM: CPT

## 2021-05-01 PROCEDURE — 99283 EMERGENCY DEPT VISIT LOW MDM: CPT | Mod: 25

## 2021-05-01 RX ORDER — MORPHINE SULFATE 50 MG/1
4 CAPSULE, EXTENDED RELEASE ORAL ONCE
Refills: 0 | Status: DISCONTINUED | OUTPATIENT
Start: 2021-05-01 | End: 2021-05-01

## 2021-05-01 RX ADMIN — MORPHINE SULFATE 4 MILLIGRAM(S): 50 CAPSULE, EXTENDED RELEASE ORAL at 01:15

## 2021-05-01 NOTE — ED PROVIDER NOTE - CLINICAL SUMMARY MEDICAL DECISION MAKING FREE TEXT BOX
60 y/o F with PMHx of chronic back pain, subdural hematoma as infant, CVA 7 years ago with residual left sided hemiplegia, L5/S1 laminectomy, BIBA from   NH for evaluation. Pt reports her left eye hurts. She was diagnosed with corneal abrasion and was seen by ophthalmologist yesterday. Pt was prescribed an eye drops and some ointment. Pt states that her eye is still painful and her chronic back pain is not resolving with Percocet and Valium.   Pt denies any injury or fall, denies worsening of vision or eye pain since she was seen by ophthalmologist. Pt requests Morphine for pain and states that after that she can go back to NH and will be able to sleep.  no acute findings on exam, no need for any emergent work up at this time. will address pain and recommend to take all her medications as prescribed.

## 2021-05-01 NOTE — ED PROVIDER NOTE - OBJECTIVE STATEMENT
62 y/o F with PMHx of chronic back pain, subdural hematoma as infant, CVA 7 years ago with residual left sided hemiplegia, L5/S1 laminectomy, BIBA from   NH for evaluation. Pt reports her left eye hurts. She was diagnosed with corneal abrasion and was seen by ophthalmologist yesterday. Pt was prescribed an eye drops and some ointment. Pt states that her eye is still painful and her chronic back pain is not resolving with Percocet and Valium.  pt states she was told at the NH that she needs to go to the hospital. Pt denies trauma, fall. No radiating pain, numbness/ tingling/ no new weakness to  her extremities. No bowel or bladder disfunction, flank pain, dysuria, hematuria, abd pain, fever, chills.

## 2021-05-01 NOTE — ED PROVIDER NOTE - PATIENT PORTAL LINK FT
You can access the FollowMyHealth Patient Portal offered by [facility] by registering at the following website: [email or web address]. By joining [employer]’s FollowMyHealth portal, you will also be able to view your health information using other applications (apps) compatible with our system.

## 2021-05-01 NOTE — ED ADULT TRIAGE NOTE - ARRIVAL INFO ADDITIONAL COMMENTS
Received a resident of '[facility]' complaining of left eye pain. Patient reports that she was seen by an eye specialist and was diagnosed with corneal abrasions and prescribed eye drops. Patient reports that she called the ambulance because "it is still bothering me." Patient with PMH of CVA and left hemiparesis.

## 2021-05-01 NOTE — ED PROVIDER NOTE - NSFOLLOWUPINSTRUCTIONS_ED_ALL_ED_FT
CHRONIC BACK PAIN - General Information           Chronic Back Pain    WHAT YOU NEED TO KNOW:    What is chronic back pain? Chronic back pain is back pain that lasts 3 months or longer. This may include pain that has not been controlled or does not improve with treatment. Your back pain may cause weakness or pain that spreads to your arms or legs.    What causes or increases my risk for chronic back pain? Conditions that affect the spine, joints, or muscles can cause back pain. These may include arthritis, spinal stenosis (narrowing of the spinal column), muscle tension, or breakdown of the spinal discs. The following increase your risk for back pain:   •Aging      •Lack of regular physical activity       •Repeated bending, lifting, or twisting, or lifting heavy items      •Obesity or pregnancy       •Injury from a fall or accident      •Driving, sitting, or standing for long periods      •Bad posture while sitting or standing      How is chronic back pain diagnosed? Your healthcare provider will ask if you have any medical conditions. He or she may ask if you have a history of back pain and how it started. He or she may watch you stand and walk, and check your range of motion. Show him or her where you feel pain and what makes it better or worse. Describe the pain, how bad it is, and how long it lasts. Tell your provider if your pain worsens at night or when you lie on your back.    How is chronic back pain treated?   •NSAIDs help decrease swelling and pain or fever. This medicine is available with or without a doctor's order. NSAIDs can cause stomach bleeding or kidney problems in certain people. If you take blood thinner medicine, always ask your healthcare provider if NSAIDs are safe for you. Always read the medicine label and follow directions.      •Acetaminophen decreases pain and fever. It is available without a doctor's order. Ask how much to take and how often to take it. Follow directions. Read the labels of all other medicines you are using to see if they also contain acetaminophen, or ask your doctor or pharmacist. Acetaminophen can cause liver damage if not taken correctly. Do not use more than 4 grams (4,000 milligrams) total of acetaminophen in one day.       •Prescription pain medicine called narcotics or opioids may be given for certain types of chronic pain. Ask your healthcare provider how to take this medicine safely.      •Muscle relaxers help decrease pain and muscle spasms.      •Steroids decrease inflammation that causes pain.       •Anesthetic medicines may be injected in or around a nerve to block pain signals from the nerves.      •Antidepressants may be used to help decrease or prevent the symptoms of depression or anxiety. They are also used to treat nerve pain.      How can I manage my symptoms?   •Apply ice for 15 to 20 minutes every hour, or as directed. Use an ice pack, or put crushed ice in a plastic bag. Cover it with a towel before you apply it to your skin. Ice decreases pain and helps prevent tissue damage.      •Apply heat for 20 to 30 minutes every 2 hours, or as directed. Heat helps decrease pain and muscle spasms.      •Use massage to loosen tense muscles. Massage may relieve back pain caused by tight muscles. Regular massages may help prevent this kind of back pain.      •Ask about acupuncture for pain relief. Back pain is sometimes relieved with acupuncture. Talk to your healthcare provider before you get this treatment to make sure it is safe for you.      What else can I do to relieve or prevent back pain?   •Manage stress. Stress can cause back pain or make it worse. Some ways to reduce stress are listening to music, meditating, or using aromatherapy. It may help to talk with a therapist about anything that is causing you stress. Your healthcare provider can give you more information.      •Stay active as much as you can without causing more pain. Ask your healthcare provider what exercises are right for you. Do not sit or lie down for long periods. This could make your back pain worse. Yoga or similar gentle movements may help relieve pain and tension in your back. Go slowly and do not strain your back as you do any movement.      •Be careful when you lift heavy objects. Do not lift anything heavy until your pain is gone. Never strain your back when you lift a heavy item. If possible, ask someone to help you.      •Go to physical therapy as directed. A physical therapist can teach you exercises to help improve movement and strength, and to decrease pain.      When should I call my doctor?   •You have severe pain.      •You have new numbness, tingling, or weakness, especially in your lower back, legs, arms, or genital area.      •You lose control of your bladder or bowel movements.       •You have a fever or sudden weight loss.      •You have new or worse pain.      •You have questions or concerns about your condition or care.      CARE AGREEMENT:    You have the right to help plan your care. Learn about your health condition and how it may be treated. Discuss treatment options with your healthcare providers to decide what care you want to receive. You always have the right to refuse treatment.

## 2021-05-05 ENCOUNTER — NON-APPOINTMENT (OUTPATIENT)
Age: 61
End: 2021-05-05

## 2021-05-05 ENCOUNTER — APPOINTMENT (OUTPATIENT)
Dept: OPHTHALMOLOGY | Facility: CLINIC | Age: 61
End: 2021-05-05
Payer: MEDICARE

## 2021-05-05 PROCEDURE — 92012 INTRM OPH EXAM EST PATIENT: CPT

## 2023-12-11 NOTE — ED PROVIDER NOTE - TOBACCO USE
----- Message from Ilda Galicia sent at 12/11/2023 12:03 PM CST -----  Contact: Haily bass [phone]  [email or web address]     Patient is calling for Medical Advice regarding:    How long has patient had these symptoms:    Pharmacy name and phone#:    Would like response via [employer]: call back    Comments: Mom is requesting a call back from the nurse to see if she can get an appt between 12/26-12/29 for the flu shot in the morning      
Spoke with mom regarding message below and nurse visit appt was rescheduled as requested. Mom verbalized understanding of changes to appt date and time.   
Unknown if ever smoked

## 2024-01-16 NOTE — ED PROVIDER NOTE - DURATION
LM for patient to call back as we have an opening 2/2/24 for an appointment   
Patient called the office, again. Patient states she did looked online and found that orthopedics is the best for sciatic pain. Patient returned the call from an old voicemail. Patient informed that Dr. Melgar has not reviewed it yet. Patient agreeable plan.   
Patient has the following symptoms: Back pain on the left side, requesting x rays or referral  The symptoms have been present for about a month, pain as gotten worse during the last month    Pharmacy has been verified.      Patient is off of work on Thursday and Friday and would like to see the doctor then, please call, okay to leave a detailed message, thank you  
Patient states this pain developed suddenly. 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 Patient states she looked online at sciatic pain and feels that this is it. Patient states she can't sit due to pain. Patient states she declines the appointment that was offered in February. Patient was informed of the Ortho Walk In through [facility] and patient declined as she had a bad experience and lost 2 years of her life from a walk in before. Patient states if she is not seen by next week she will go to the ER.  Will review with Dr. Melgar the covering provider for Dr. Ramirez.   
Per Dr. Melgar patient can visit the emergency room if patient feels its necessary and make an appointment with Dr. Ramirez. Left a detailed message  
today